# Patient Record
Sex: MALE | Race: BLACK OR AFRICAN AMERICAN | Employment: UNEMPLOYED | URBAN - METROPOLITAN AREA
[De-identification: names, ages, dates, MRNs, and addresses within clinical notes are randomized per-mention and may not be internally consistent; named-entity substitution may affect disease eponyms.]

---

## 2021-08-02 ENCOUNTER — OFFICE VISIT (OUTPATIENT)
Dept: URGENT CARE | Facility: URGENT CARE | Age: 46
End: 2021-08-02
Payer: COMMERCIAL

## 2021-08-02 VITALS
WEIGHT: 171.8 LBS | OXYGEN SATURATION: 98 % | HEART RATE: 69 BPM | RESPIRATION RATE: 20 BRPM | DIASTOLIC BLOOD PRESSURE: 97 MMHG | SYSTOLIC BLOOD PRESSURE: 149 MMHG | TEMPERATURE: 97.7 F

## 2021-08-02 DIAGNOSIS — Z53.21 PATIENT LEFT WITHOUT BEING SEEN: Primary | ICD-10-CM

## 2021-08-02 PROCEDURE — 99207 PR NO CHARGE LOS: CPT | Performed by: PHYSICIAN ASSISTANT

## 2023-08-26 ENCOUNTER — APPOINTMENT (OUTPATIENT)
Dept: GENERAL RADIOLOGY | Facility: CLINIC | Age: 48
End: 2023-08-26
Attending: EMERGENCY MEDICINE
Payer: COMMERCIAL

## 2023-08-26 ENCOUNTER — HOSPITAL ENCOUNTER (EMERGENCY)
Facility: CLINIC | Age: 48
Discharge: HOME | End: 2023-08-26
Attending: EMERGENCY MEDICINE | Admitting: EMERGENCY MEDICINE
Payer: COMMERCIAL

## 2023-08-26 VITALS
HEART RATE: 97 BPM | RESPIRATION RATE: 16 BRPM | TEMPERATURE: 97.8 F | DIASTOLIC BLOOD PRESSURE: 88 MMHG | OXYGEN SATURATION: 99 % | SYSTOLIC BLOOD PRESSURE: 121 MMHG

## 2023-08-26 DIAGNOSIS — M25.561 ACUTE PAIN OF RIGHT KNEE: Primary | ICD-10-CM

## 2023-08-26 PROCEDURE — 99283 EMERGENCY DEPT VISIT LOW MDM: CPT

## 2023-08-26 PROCEDURE — 73562 X-RAY EXAM OF KNEE 3: CPT | Mod: RT

## 2023-08-26 NOTE — ED PROVIDER NOTES
History     Chief Complaint:  Knee Injury       HPI   Marianne Anderson is a 48 year old male who presents with right knee pain since landing on it after slipping and falling on hard surface at 1130 today. He did not hit his head and denies loss of consciousness as well as nausea or vomiting. He denies other injuries and has been able to ambulate on the knee today. He notes some swelling over his right knee but denies numbness or tingling as well as history of surgeries on his right knee. Patient will set up follow up with a primary doctor.     Independent Historian:    None     Review of External Notes:  NA      Medications:    The patient is not currently taking any prescribed medications.     Past Medical History:    The patient denies any significant past medical history.     Physical Exam   Patient Vitals for the past 24 hrs:   BP Temp Temp src Pulse Resp SpO2   08/26/23 1604 138/86 97.8  F (36.6  C) Temporal 93 16 99 %        Physical Exam  General: Alert, No distress. Nontoxic appearance  Head: No signs of trauma.   Mouth/Throat: Oropharynx moist.   Eyes: Conjunctivae are normal. Pupils are equal..   Neck: Normal range of motion.    CV: Appears well perfused.  Resp:No respiratory distress.   MSK: right knee skin intact. Moderate swelling of the anterior lateral knee. Tenderness to palpation. No warmth or redness. Anterior and posterior drawer negative. No laxity with ferrous and valgus stress. Full active range of motion. Pain with resisted flexion and extension. . No obvious deformity.   Neuro: The patient is alert and interactive. BENJAMIN. Speech normal. GCS 15  Skin: No lesion or sign of trauma noted.   Psych: normal mood and affect. behavior is normal.      Emergency Department Course   Imaging:  XR Knee Right 3 Views   Final Result   IMPRESSION: Degenerative narrowing lateral compartment right knee. calcification within the infrapatellar fat pad may be loose within the joint. Osteophytic spurring medial and  lateral joint line. No evidence for acute fracture. No effusion.        Report per radiology    Emergency Department Course & Assessments:  Interventions:  Medications - No data to display     Assessments/Consultations/Discussion of Management or Tests:   1805 I obtained history and examined the patient as noted above.     Independent Interpretation (X-rays, CTs, rhythm strip):  Right knee x-ray negative for fracture, dislocation    Social Determinants of Health affecting care:  None      Disposition:  The patient was discharged to home.     Impression & Plan    Medical Decision Making:  Patient appears well on exam.  He has moderate soft tissue swelling of the right knee.  Knee joint is stable on exam.  X-ray of the right knee is negative for fracture.  He is given instruction to follow-up outpatient if his symptoms do not improve.  It is explained to him that an MRI is not indicated at this time, as there is swelling and we need to give time for the swelling to dissipate before any advanced imaging would be beneficial.  He is able to ambulate without difficulty.  Return precautions are given and he verbalizes understanding.  He was discharged home in stable condition.    Diagnosis:    ICD-10-CM    1. Acute pain of right knee  M25.561            Discharge Medications:  New Prescriptions    No medications on file          Scribe Disclosure:  I, Tom Maria, am serving as a scribe at 6:06 PM on 8/26/2023 to document services personally performed by Lakhwinder Magaña MD based on my observations and the provider's statements to me.  8/26/2023   Lakhwinder Magaña MD Peery, Stephen, MD  08/27/23 0018

## 2023-08-26 NOTE — DISCHARGE INSTRUCTIONS
If your pain is not improving over the next week, please follow-up with a primary care provider.    Please return to the emergency department if your pain becomes too severe to walk, you develop significant swelling, redness, fever.    You can alternate Tylenol and ibuprofen every 6 hours for pain.    RICE    Rest  Ice  Compression  Elevation

## 2023-08-26 NOTE — ED TRIAGE NOTES
Pt report he slipped on hard slippery surface and landed on his right knee now reports severe pain     Denies hitting head no loc or n/v